# Patient Record
(demographics unavailable — no encounter records)

---

## 2024-10-25 NOTE — HISTORY OF PRESENT ILLNESS
[FreeTextEntry1] :  Patient MADHU MOSQUEDA [facility] MRN [id number] Hospital Visit  [facility] - Attending Physician John Lino  Status Complete      Hospital Course:  Discharge Date 03-Oct-2024  Admission Date 02-Oct-2024 04:23  Reason for Admission Early acute cholecystitis  Hospital Course   Admission HPI:  HPI: Patient presents to the ED for 4 days of NV with upper abd pain.   present for discussions with patient. Patient states went to a  clinic today and tested positive for Giardiasis but wasn't given any  medication.  Otherwise  no PMH.  PSH of remote csection.  VSS.  Non toxic.  Well appearing. No fever/chills, No ear pain/sore throat/dysphagia, No chest  pain/palpitations, no SOB/cough/wheeze/stridor, No lower abdominal pain, No  Diarrhea, no dysuria/frequency/discharge, No neck/back pain, no rash, no  changes in neurological status/function. States that the pain has been waxing  and waning, however was constant all day yesterday into today. Pain has largely  improved with analgesics in the ED as has the nausea/vomiting. Reports history  of post prandial RUQ abdominal pain in the past.  (02 Oct 2024 04:19)      Hospital Course:  CT scan on admission showed cholelithiasis. RUQ US showed cholelithiasis and  sludge with gallbladder wall thickening. Patient was admitted the acute care  surgery service and taken to the OR on 10/2 for lap cholecystectomy. Pt  tolerated procedure well. Once pt was tolerating diet, pain controlled on PO  medications, OOB ambulating and voiding, she was deemed stable for discharge  home with outpatient follow-up as outlined below.    Patient is advised to RETURN TO THE EMERGENCY DEPARTMENT for any of the  following - worsening pain, fever/chills, nausea/vomiting, altered mental  status, chest pain, shortness of breath, or any other new / worsening symptom.    Med Reconciliation:  Medication Reconciliation Status Admission Reconciliation is Completed  Discharge Reconciliation is Completed    Click to Modify Medication Indication on Note Save  Discharge Medications acetaminophen 325 mg oral tablet: 3 tab(s) orally every 6  hours As needed Mild Pain (1 - 3)  amoxicillin-clavulanate 875 mg-125 mg oral tablet: 1 tab(s) orally 2 times a  day  famotidine 20 mg oral tablet: 1 tab(s) orally once a day as needed  Omega-3 Fish Oil 1000 mg oral capsule: 1 cap(s) orally once a day    ,  ,  Care Plan/Procedures:  Discharge Diagnoses, Assessment and Plan of Treatment PRINCIPAL DISCHARGE  DIAGNOSIS  Diagnosis: Acute cholecystitis  Assessment and Plan of Treatment: - FOLLOW UP: Please call and make an  appointment with the Acute Care Surgery Clinic 10-14 days after discharge.  Also, please call and make an appointment with your primary care physician as  per your usual schedule.  - ACTIVITY: May return to normal activities as tolerated, however refrain from  heavy lifting > 10-15 lbs.  - DIET: May continue regular diet.  - MEDICATIONS: Please resume all home medications as previously prescribed. You  can take tylenol for pain relief, as needed. Continue oral antibiotics, as  prescribed.  - WOUND CARE: Please, keep wound site clean and dry. You may shower 48 hours  post-operatively, but do not bathe, swim, or submerge wounds in water for  extended period of time.  Patient is advised to RETURN TO THE EMERGENCY DEPARTMENT for any of the  following - worsening pain, fever/chills, nausea/vomiting, altered mental  status, chest pain, shortness of breath, or any other new / worsening symptom.  Discharge Procedures, Findings and Treatment PRINCIPAL PROCEDURE  Procedure: Laparoscopic cholecystectomy with C-arm  Findings and Treatment:  Goal(s) To get better and follow your care plan as instructed.  Follow Up:  Follow-up Clinics [facility] Acute Care Surgery  Acute Care Surgery  [address] [place] [id number]  Phone: [phone]  Fax:  Follow-up Clinics (For SysAdmin Use Only) [id number]: \F\F||False;  Discharge Diet DASH Diet  Activity No heavy lifting/straining, Showering allowed  Quality Measures:  Does the patient have difficulty running errands alone like visiting a doctors  office or shopping? No  Does the patient have difficulty climbing stairs? No  Cognition: The patient has No difficulties  Patient Condition Stable  Hospice Patient No  Core Measure Site No  Does the patient have a principal diagnosis of ischemic stroke, hemorrhagic  stroke, or TIA? No  Does the patient have a principal diagnosis of Acute Myocardial Infarction?              No  Has the patient had a Percutaneous Coronary Intervention? No  Document Complete:  Physician Section Complete This document is complete and the patient is ready  for discharge.  For questions about your prescriptions, please call: [phone]  Is this contact telephone number correct? Yes  Attending Attestation Statement I have personally seen and examined the  patient. I have collaborated with and supervised the  . on the discharge service for the patient. I have reviewed and made amendments  to the documentation where necessary    Patient presents  to ACS  for post op exam   Patient a t  time  of  this e xam  denies  any fevers no chills  no  n/v/d  nl bm nl urination   Denies  any acute abdominal pain No food intolerances

## 2024-10-25 NOTE — ASSESSMENT
[FreeTextEntry1] : RTC prn  Diet modification   avoid/limit  fatty  foods Shower daily no  bathing until incision sites all healed Discussion with patient   All questions answered  Any acute symptoms and or concerns patient understands  to call back office  and  or  go  directly to [facility] ED

## 2024-10-25 NOTE — PHYSICAL EXAM
[Normal Breath Sounds] : Normal breath sounds [Normal Heart Sounds] : normal heart sounds [Abdomen Tenderness] : ~T ~M No abdominal tenderness [No Rash or Lesion] : No rash or lesion [Purpura] : no purpura  [Petechiae] : no petechiae [Skin Ulcer] : no ulcer [Skin Induration] : no induration [Alert] : alert [Oriented to Person] : oriented to person [Oriented to Place] : oriented to place [Oriented to Time] : oriented to time [Calm] : calm [de-identified] : wdwn   female  in  nad [de-identified] : non icteric sclera  PERRLA EOMS intact  and  nl [de-identified] : trachea  midline [de-identified] : soft  no  guarding  no  rebound  non  tender  no distension of  abdomen  all incision sites c/d/i  no  signs of cellulitis  no  edema no erythema  no  rigidity of abdominal wall